# Patient Record
Sex: MALE | Race: WHITE | NOT HISPANIC OR LATINO | Employment: FULL TIME | URBAN - METROPOLITAN AREA
[De-identification: names, ages, dates, MRNs, and addresses within clinical notes are randomized per-mention and may not be internally consistent; named-entity substitution may affect disease eponyms.]

---

## 2022-04-06 ENCOUNTER — APPOINTMENT (OUTPATIENT)
Dept: GENERAL RADIOLOGY | Facility: HOSPITAL | Age: 42
End: 2022-04-06

## 2022-04-06 ENCOUNTER — HOSPITAL ENCOUNTER (EMERGENCY)
Facility: HOSPITAL | Age: 42
Discharge: HOME | End: 2022-04-06
Attending: EMERGENCY MEDICINE | Admitting: EMERGENCY MEDICINE

## 2022-04-06 VITALS
BODY MASS INDEX: 31.23 KG/M2 | HEART RATE: 79 BPM | DIASTOLIC BLOOD PRESSURE: 99 MMHG | TEMPERATURE: 98.3 F | RESPIRATION RATE: 18 BRPM | OXYGEN SATURATION: 98 % | WEIGHT: 223.11 LBS | SYSTOLIC BLOOD PRESSURE: 160 MMHG | HEIGHT: 71 IN

## 2022-04-06 DIAGNOSIS — S29.011A MUSCLE STRAIN OF CHEST WALL, INITIAL ENCOUNTER: Primary | ICD-10-CM

## 2022-04-06 PROCEDURE — 99282 EMERGENCY DEPT VISIT SF MDM: CPT

## 2022-04-06 PROCEDURE — 71101 X-RAY EXAM UNILAT RIBS/CHEST: CPT

## 2022-04-06 RX ORDER — CYCLOBENZAPRINE HCL 10 MG
10 TABLET ORAL 3 TIMES DAILY PRN
Qty: 15 TABLET | Refills: 0 | Status: SHIPPED | OUTPATIENT
Start: 2022-04-06 | End: 2022-05-19

## 2022-04-06 RX ORDER — ACETAMINOPHEN AND CODEINE PHOSPHATE 300; 30 MG/1; MG/1
1 TABLET ORAL EVERY 6 HOURS PRN
Qty: 15 TABLET | Refills: 0 | Status: SHIPPED | OUTPATIENT
Start: 2022-04-06 | End: 2022-05-19

## 2022-04-06 NOTE — ED PROVIDER NOTES
Subjective   Patient is a 41-year-old male complaining of left-sided chest pain for the past 1 week.  The pain is worse in certain motions coughing deep breathing.  Patient needs pain is sharp lasting hours at a time.  He denies fever shortness of breath or other complaint.          Review of Systems  For headache earache throat cough fever shortness of breath abdominal pain vomiting diarrhea dysuria or other complaint  No past medical history on file.    No Known Allergies    No past surgical history on file.    No family history on file.    Social History     Socioeconomic History   • Marital status:            Objective   Physical Exam  HEENT exam shows TMs to be clear    Oropharynx clear and moist.  Neck has no [place] JVD or bruits.  Lungs clear.  Heart has regular rhythm.  Chest is tender on palpation of his left lateral chest wall.  There is no crepitus or subcu air.  Abdomen is soft nontender.  Procedures           ED Course          XR Ribs Left With PA Chest    Result Date: 4/6/2022  1.No definite acute osseous left rib abnormality is seen. A subtle nondisplaced fracture could be radiographically occult. 2.No acute cardiopulmonary abnormality is identified.  Electronically Signed By-Kwabena Saldana MD On:4/6/2022 5:42 PM This report was finalized on [id number] by  Kwabena Saldana MD.                                            MDM  Number of Diagnoses or Management Options  Diagnosis management comments: Patient is findings consistent with chest wall strain.  There is no evidence of pulmonary abnormality or rib fractures.  Patient will be discharged.  Will be placed on Tylenol 3 and Flexeril.  Use a heating pad see his MD for recheck.       Amount and/or Complexity of Data Reviewed  Tests in the radiology section of CPT®: reviewed    Risk of Complications, Morbidity, and/or Mortality  Presenting problems: high  Diagnostic procedures: high  Management options: high    Patient Progress  Patient  progress: stable      Final diagnoses:   Muscle strain of chest wall, initial encounter       ED Disposition  ED Disposition     ED Disposition   Discharge    Condition   Stable    Comment   --             No follow-up provider specified.       Medication List      New Prescriptions    acetaminophen-codeine 300-30 MG per tablet  Commonly known as: TYLENOL #3  Take 1 tablet by mouth Every 6 (Six) Hours As Needed for Moderate Pain .     cyclobenzaprine 10 MG tablet  Commonly known as: FLEXERIL  Take 1 tablet by mouth 3 (Three) Times a Day As Needed for Muscle Spasms for up to 15 doses.           Where to Get Your Medications      These medications were sent to [id number] - [place], [place] - [address] [facility] - [phone]  - [phone]   [address] [facility] [place] [address]    Phone: [phone]   · acetaminophen-codeine 300-30 MG per tablet  · cyclobenzaprine 10 MG tablet          Gabe Bryant MD  04/06/22 7896